# Patient Record
Sex: MALE | Race: WHITE | NOT HISPANIC OR LATINO | URBAN - METROPOLITAN AREA
[De-identification: names, ages, dates, MRNs, and addresses within clinical notes are randomized per-mention and may not be internally consistent; named-entity substitution may affect disease eponyms.]

---

## 2019-01-22 ENCOUNTER — AMBULATORY SURGICAL CENTER (OUTPATIENT)
Dept: URBAN - METROPOLITAN AREA SURGERY 10 | Facility: SURGERY | Age: 49
End: 2019-01-22
Payer: COMMERCIAL

## 2019-01-22 ENCOUNTER — OFFICE (OUTPATIENT)
Dept: URBAN - METROPOLITAN AREA PATHOLOGY 4 | Facility: PATHOLOGY | Age: 49
End: 2019-01-22
Payer: COMMERCIAL

## 2019-01-22 DIAGNOSIS — K21.9 GASTRO-ESOPHAGEAL REFLUX DISEASE WITHOUT ESOPHAGITIS: ICD-10-CM

## 2019-01-22 DIAGNOSIS — K22.70 BARRETT'S ESOPHAGUS WITHOUT DYSPLASIA: ICD-10-CM

## 2019-01-22 DIAGNOSIS — Z12.11 ENCOUNTER FOR SCREENING FOR MALIGNANT NEOPLASM OF COLON: ICD-10-CM

## 2019-01-22 DIAGNOSIS — K64.0 FIRST DEGREE HEMORRHOIDS: ICD-10-CM

## 2019-01-22 DIAGNOSIS — D12.3 BENIGN NEOPLASM OF TRANSVERSE COLON: ICD-10-CM

## 2019-01-22 PROCEDURE — 45385 COLONOSCOPY W/LESION REMOVAL: CPT | Mod: 33 | Performed by: INTERNAL MEDICINE

## 2019-01-22 PROCEDURE — 88305 TISSUE EXAM BY PATHOLOGIST: CPT | Mod: 26 | Performed by: INTERNAL MEDICINE

## 2019-01-22 PROCEDURE — 43239 EGD BIOPSY SINGLE/MULTIPLE: CPT | Mod: 48 | Performed by: INTERNAL MEDICINE

## 2024-02-08 ENCOUNTER — OFFICE (OUTPATIENT)
Dept: URBAN - METROPOLITAN AREA PATHOLOGY 4 | Facility: PATHOLOGY | Age: 54
End: 2024-02-08

## 2024-02-08 ENCOUNTER — AMBULATORY SURGICAL CENTER (OUTPATIENT)
Dept: URBAN - METROPOLITAN AREA SURGERY 10 | Facility: SURGERY | Age: 54
End: 2024-02-08
Payer: COMMERCIAL

## 2024-02-08 DIAGNOSIS — Z09 ENCOUNTER FOR FOLLOW-UP EXAMINATION AFTER COMPLETED TREATMEN: ICD-10-CM

## 2024-02-08 DIAGNOSIS — K22.89 OTHER SPECIFIED DISEASE OF ESOPHAGUS: ICD-10-CM

## 2024-02-08 DIAGNOSIS — K64.8 OTHER HEMORRHOIDS: ICD-10-CM

## 2024-02-08 DIAGNOSIS — K31.89 OTHER DISEASES OF STOMACH AND DUODENUM: ICD-10-CM

## 2024-02-08 DIAGNOSIS — K57.90 DIVERTICULOSIS OF INTESTINE, PART UNSPECIFIED, WITHOUT PERFO: ICD-10-CM

## 2024-02-08 DIAGNOSIS — Z86.010 PERSONAL HISTORY OF COLONIC POLYPS: ICD-10-CM

## 2024-02-08 DIAGNOSIS — K21.9 GASTRO-ESOPHAGEAL REFLUX DISEASE WITHOUT ESOPHAGITIS: ICD-10-CM

## 2024-02-08 PROCEDURE — G0105 COLORECTAL SCRN; HI RISK IND: HCPCS | Performed by: INTERNAL MEDICINE

## 2024-02-08 PROCEDURE — 43239 EGD BIOPSY SINGLE/MULTIPLE: CPT | Performed by: INTERNAL MEDICINE

## 2024-02-08 PROCEDURE — 88305 TISSUE EXAM BY PATHOLOGIST: CPT | Performed by: PATHOLOGY

## 2024-02-12 PROBLEM — Z86.010 PERSONAL HISTORY OF COLON POLYPS: Status: ACTIVE | Noted: 2024-02-12

## 2024-02-12 PROBLEM — K22.70 BARRETT'S ESOPHAGUS: Status: ACTIVE | Noted: 2024-02-12

## 2024-10-15 ENCOUNTER — DOCUMENTATION (OUTPATIENT)
Dept: ENDOCRINOLOGY | Facility: CLINIC | Age: 54
End: 2024-10-15

## 2024-10-15 ENCOUNTER — OFFICE VISIT (OUTPATIENT)
Dept: ENDOCRINOLOGY | Facility: CLINIC | Age: 54
End: 2024-10-15
Payer: COMMERCIAL

## 2024-10-15 VITALS
DIASTOLIC BLOOD PRESSURE: 78 MMHG | OXYGEN SATURATION: 99 % | BODY MASS INDEX: 28.06 KG/M2 | WEIGHT: 196 LBS | HEART RATE: 80 BPM | SYSTOLIC BLOOD PRESSURE: 126 MMHG | HEIGHT: 70 IN

## 2024-10-15 DIAGNOSIS — E11.65 TYPE 2 DIABETES MELLITUS WITH HYPERGLYCEMIA, WITHOUT LONG-TERM CURRENT USE OF INSULIN: Primary | ICD-10-CM

## 2024-10-15 LAB
ALBUMIN SERPL-MCNC: 4.5 G/DL (ref 3.5–5.2)
ALBUMIN UR-MCNC: <1.2 MG/DL
ALBUMIN/GLOB SERPL: 1.6 G/DL
ALP SERPL-CCNC: 40 U/L (ref 39–117)
ALT SERPL W P-5'-P-CCNC: 15 U/L (ref 1–41)
ANION GAP SERPL CALCULATED.3IONS-SCNC: 12.7 MMOL/L (ref 5–15)
AST SERPL-CCNC: 16 U/L (ref 1–40)
BILIRUB SERPL-MCNC: 0.3 MG/DL (ref 0–1.2)
BUN SERPL-MCNC: 22 MG/DL (ref 6–20)
BUN/CREAT SERPL: 21.6 (ref 7–25)
CALCIUM SPEC-SCNC: 9.6 MG/DL (ref 8.6–10.5)
CHLORIDE SERPL-SCNC: 101 MMOL/L (ref 98–107)
CO2 SERPL-SCNC: 26.3 MMOL/L (ref 22–29)
CREAT SERPL-MCNC: 1.02 MG/DL (ref 0.76–1.27)
CREAT UR-MCNC: 69.9 MG/DL
EGFRCR SERPLBLD CKD-EPI 2021: 86.3 ML/MIN/1.73
EXPIRATION DATE: ABNORMAL
EXPIRATION DATE: ABNORMAL
GLOBULIN UR ELPH-MCNC: 2.8 GM/DL
GLUCOSE BLDC GLUCOMTR-MCNC: 163 MG/DL (ref 70–130)
GLUCOSE SERPL-MCNC: 151 MG/DL (ref 65–99)
HBA1C MFR BLD: 7.1 % (ref 4.5–5.7)
Lab: ABNORMAL
Lab: ABNORMAL
MICROALBUMIN/CREAT UR: NORMAL MG/G{CREAT}
POTASSIUM SERPL-SCNC: 4.7 MMOL/L (ref 3.5–5.2)
PROT SERPL-MCNC: 7.3 G/DL (ref 6–8.5)
SODIUM SERPL-SCNC: 140 MMOL/L (ref 136–145)
TSH SERPL DL<=0.05 MIU/L-ACNC: 1.49 UIU/ML (ref 0.27–4.2)

## 2024-10-15 PROCEDURE — 80053 COMPREHEN METABOLIC PANEL: CPT | Performed by: INTERNAL MEDICINE

## 2024-10-15 PROCEDURE — 82947 ASSAY GLUCOSE BLOOD QUANT: CPT | Performed by: INTERNAL MEDICINE

## 2024-10-15 PROCEDURE — 82570 ASSAY OF URINE CREATININE: CPT | Performed by: INTERNAL MEDICINE

## 2024-10-15 PROCEDURE — 84443 ASSAY THYROID STIM HORMONE: CPT | Performed by: INTERNAL MEDICINE

## 2024-10-15 PROCEDURE — 99204 OFFICE O/P NEW MOD 45 MIN: CPT | Performed by: INTERNAL MEDICINE

## 2024-10-15 PROCEDURE — 83036 HEMOGLOBIN GLYCOSYLATED A1C: CPT | Performed by: INTERNAL MEDICINE

## 2024-10-15 PROCEDURE — 84305 ASSAY OF SOMATOMEDIN: CPT | Performed by: INTERNAL MEDICINE

## 2024-10-15 PROCEDURE — 86341 ISLET CELL ANTIBODY: CPT | Performed by: INTERNAL MEDICINE

## 2024-10-15 PROCEDURE — 82043 UR ALBUMIN QUANTITATIVE: CPT | Performed by: INTERNAL MEDICINE

## 2024-10-15 RX ORDER — NAPROXEN 500 MG/1
500 TABLET, DELAYED RELEASE ORAL 2 TIMES DAILY
COMMUNITY

## 2024-10-15 RX ORDER — BLOOD-GLUCOSE SENSOR
1 EACH MISCELLANEOUS
Qty: 6 EACH | Refills: 3 | Status: SHIPPED | OUTPATIENT
Start: 2024-10-15

## 2024-10-15 RX ORDER — ALPRAZOLAM 0.25 MG
0.25 TABLET ORAL
COMMUNITY

## 2024-10-15 RX ORDER — DULAGLUTIDE 3 MG/.5ML
INJECTION, SOLUTION SUBCUTANEOUS
COMMUNITY

## 2024-10-15 RX ORDER — OMEPRAZOLE 40 MG/1
CAPSULE, DELAYED RELEASE ORAL DAILY
COMMUNITY
Start: 2024-07-17

## 2024-10-15 RX ORDER — METFORMIN HCL 500 MG
TABLET, EXTENDED RELEASE 24 HR ORAL
COMMUNITY
Start: 2024-07-17

## 2024-10-15 NOTE — PROGRESS NOTES
Pt seen for CGM start and nutrition review. Pt started  on Lorraine 3, he plans to pay out of pocket, he is paired for sharing in [employer].  Discussed nutrition for glucose control and physical activity. Encouraged pt to reach out with questions/concerns.

## 2024-10-15 NOTE — PROGRESS NOTES
"     Office Note      Date: 10/15/2024  Patient Name: Brian James  MRN: [id number]  : 1970    Chief Complaint   Patient presents with    Diabetes       History of Present Illness:   Brian James is a 54 y.o. male who presents for Diabetes type 2. Diagnosed in: . Treated in past with oral agents. He was on glimepiride in the past.  Current treatments: metformin and trulicity. Number of insulin shots per day: none. Checks blood sugar none times a day. Has low blood sugar: no. Aspirin use: No -   . Statin use: No -   . ACE-I/ARB use: No -   .  Last eye exam:  2024.    Last A1c was 7.7%.  He has been very active with exercise.  He has changed his diet.  He has lost 20 pounds.  However, he notes the A1c has continued to increase.    Subjective      Diabetic Complications:  Eyes: No  Kidneys: No  Feet: No  Heart: No    Diet and Exercise:  Meals per day: 3  Minutes of exercise per week: 300 mins.    Review of Systems:   Review of Systems   Constitutional: Negative.    HENT: Negative.     Eyes: Negative.    Respiratory: Negative.     Cardiovascular: Negative.    Gastrointestinal: Negative.    Endocrine: Negative.    Genitourinary: Negative.    Musculoskeletal: Negative.    Skin: Negative.    Allergic/Immunologic: Negative.    Neurological: Negative.    Hematological: Negative.    Psychiatric/Behavioral: Negative.         The following portions of the patient's history were reviewed and updated as appropriate: allergies, current medications, past family history, past medical history, past social history, past surgical history, and problem list.    Objective     Visit Vitals  /78   Pulse 80   Ht 177.8 cm (70\")   Wt 88.9 kg (196 lb)   SpO2 99%   BMI 28.12 kg/m²       Physical Exam:  Physical Exam  Constitutional:       Appearance: Normal appearance.   HENT:      Head: Normocephalic and atraumatic.   Eyes:      Extraocular Movements: Extraocular movements intact.      Conjunctiva/sclera: Conjunctivae " "normal.   Neck:      Thyroid: No thyroid mass, thyromegaly or thyroid tenderness.   Cardiovascular:      Rate and Rhythm: Normal rate and regular rhythm.      Pulses: Normal pulses.           Dorsalis pedis pulses are 2+ on the right side and 2+ on the left side.        Posterior tibial pulses are 2+ on the right side and 2+ on the left side.      Heart sounds: Normal heart sounds.   Pulmonary:      Effort: Pulmonary effort is normal.      Breath sounds: Normal breath sounds.   Abdominal:      General: Bowel sounds are normal.      Palpations: Abdomen is soft.   Musculoskeletal:         General: Normal range of motion.      Cervical back: Normal range of motion and neck supple.      Right foot: Deformity present.      Left foot: Deformity present.   Feet:      Right foot:      Protective Sensation: 5 sites tested.  5 sites sensed.      Skin integrity: Skin integrity normal.      Toenail Condition: Right toenails are normal.      Left foot:      Protective Sensation: 5 sites tested.  5 sites sensed.      Skin integrity: Skin integrity normal.      Toenail Condition: Left toenails are normal.      Comments: Hammer toes  Lymphadenopathy:      Cervical: No cervical adenopathy.   Skin:     General: Skin is warm and dry.   Neurological:      General: No focal deficit present.      Mental Status: He is alert.   Psychiatric:         Mood and Affect: Mood normal.         Behavior: Behavior normal.         Thought Content: Thought content normal.         Judgment: Judgment normal.         Labs:    HbA1c  Hemoglobin A1C   Date Value Ref Range Status   10/15/2024 7.1 (A) 4.5 - 5.7 % Final   .    CMP  No results found for: \"GLUCOSE\", \"BUN\", \"CREATININE\", \"EGFRIFNONA\", \"EGFRIFAFRI\", \"BCR\", \"K\", \"CO2\", \"CALCIUM\", \"PROTENTOTREF\", \"LABIL2\", \"BILIRUBIN\", \"AST\", \"ALT\"     Lipid Panel        TSH  No results found for: \"TSH\", \"FREET4\"     Hemoglobin A1C  Lab Results   Component Value Date    HGBA1C 7.1 (A) 10/15/2024    " "    Microalbumin/Creatinine  No results found for: \"MALBCRERATI\"        Assessment / Plan      Assessment & Plan:  Diagnoses and all orders for this visit:    1. Type 2 diabetes mellitus with hyperglycemia, without long-term current use of insulin (Primary)  Assessment & Plan:  Diabetes is improving with treatment.   Continue current treatment regimen.  We discussed options of increasing trulicity but he wanted to continue to work on diet/exercise.  Diabetes will be reassessed in 6 months.    We discussed CGM.  He would like to use this even though his insurance won't cover it.      I would like to check some further labs today to look for type I diabetes also.    Will have him meet with our diabetes educator today as well.    Orders:  -     Comprehensive Metabolic Panel; Future  -     TSH; Future  -     Microalbumin / Creatinine Urine Ratio - Urine, Clean Catch; Future  -     IA-2 Autoantibodies; Future  -     Glutamic Acid Decarboxylase; Future  -     POC Glycosylated Hemoglobin (Hb A1C)  -     POC Glucose, Blood  -     Insulin-like Growth Factor; Future    Other orders  -     Continuous Glucose Sensor (FreeStyle Lorraine 3 Sensor) misc; Use 1 each Every 14 (Fourteen) Days.  Dispense: 6 each; Refill: 3       Current Outpatient Medications   Medication Instructions    ALPRAZolam (XANAX) 0.25 mg    Continuous Glucose Sensor (FreeStyle Lorraine 3 Sensor) misc 1 each, Does not apply, Every 14 Days    EC-Naproxen 500 mg, 2 Times Daily    metFORMIN ER (GLUCOPHAGE-XR) 500 MG 24 hr tablet Daily With Breakfast    omeprazole (priLOSEC) 40 MG capsule Daily    Trulicity 3 MG/0.5ML solution pen-injector INJECT 3MG SUBCUTANEOUSLY WEEKLY      Return in about 6 months (around 4/15/2025) for Recheck with A1c.    Electronically signed by: Paddy Gresham MD  10/15/2024  "

## 2024-10-15 NOTE — ASSESSMENT & PLAN NOTE
Diabetes is improving with treatment.   Continue current treatment regimen.  We discussed options of increasing trulicity but he wanted to continue to work on diet/exercise.  Diabetes will be reassessed in 6 months.    We discussed CGM.  He would like to use this even though his insurance won't cover it.      I would like to check some further labs today to look for type I diabetes also.    Will have him meet with our diabetes educator today as well.

## 2024-10-18 LAB
GAD65 AB SER IA-ACNC: <5 U/ML (ref 0–5)
IGF-I SERPL-MCNC: 141 NG/ML (ref 68–247)

## 2024-10-27 LAB — ISLET CELL512 AB SER-ACNC: <7.5 U/ML

## 2025-02-19 LAB
GFR SERPL CREATININE-BSD FRML MDRD: 103 ML/MIN/1.73M*2 (ref 60–?)
HBA1C MFR BLD: 7.6 %

## 2025-04-24 ENCOUNTER — OFFICE VISIT (OUTPATIENT)
Dept: ENDOCRINOLOGY | Facility: CLINIC | Age: 55
End: 2025-04-24
Payer: COMMERCIAL

## 2025-04-24 VITALS
BODY MASS INDEX: 28.6 KG/M2 | OXYGEN SATURATION: 99 % | HEIGHT: 70 IN | SYSTOLIC BLOOD PRESSURE: 102 MMHG | WEIGHT: 199.8 LBS | DIASTOLIC BLOOD PRESSURE: 78 MMHG | HEART RATE: 82 BPM

## 2025-04-24 DIAGNOSIS — E11.65 TYPE 2 DIABETES MELLITUS WITH HYPERGLYCEMIA, WITHOUT LONG-TERM CURRENT USE OF INSULIN: Primary | ICD-10-CM

## 2025-04-24 LAB
EXPIRATION DATE: ABNORMAL
GLUCOSE BLDC GLUCOMTR-MCNC: 144 MG/DL (ref 70–130)
Lab: ABNORMAL

## 2025-04-24 NOTE — PROGRESS NOTES
"     Office Note      Date: 2025  Patient Name: Brian James  MRN: [id number]  : 1970    Chief Complaint   Patient presents with    Diabetes     Type 2 diabetes mellitus with hyperglycemia, without long-term current use of insulin       History of Present Illness:   Brian James is a 54 y.o. male who presents for Diabetes type 2. Diagnosed in: . Treated in past with oral agents. He was on glimepiride in the past.  Current treatments: metformin and trulicity. Number of insulin shots per day: none. Checks blood sugar 288 times a day - on FreeStyle Lorraine 3. Has low blood sugar: no. Aspirin use: No. Statin use: No. ACE-I/ARB use: No.  Change in health since last visit: none.  Last eye exam:  2024.     Subjective      Diabetic Complications:  Eyes: No  Kidneys: No  Feet: No  Heart: No    Diet and Exercise:  Meals per day: 3  Minutes of exercise per week: 300 mins.    Review of Systems:   Review of Systems   Constitutional: Negative.    Cardiovascular: Negative.    Gastrointestinal: Negative.    Endocrine: Negative.        The following portions of the patient's history were reviewed and updated as appropriate: allergies, current medications, past family history, past medical history, past social history, past surgical history, and problem list.    Objective     Visit Vitals  /78 (BP Location: Right arm, Patient Position: Sitting, Cuff Size: Adult)   Pulse 82   Ht 177.8 cm (70\")   Wt 90.6 kg (199 lb 12.8 oz)   SpO2 99%   BMI 28.67 kg/m²       Physical Exam:  Physical Exam  Constitutional:       Appearance: Normal appearance.   Neurological:      Mental Status: He is alert.         Labs:    HbA1c  Lab Results   Component Value Date    HGBA1C 7.6 2025       CMP  Lab Results   Component Value Date    GLUCOSE 151 (H) 10/15/2024    BUN 22 (H) 10/15/2024    CREATININE 1.02 10/15/2024    BCR 21.6 10/15/2024    K 4.7 10/15/2024    CO2 26.3 10/15/2024    CALCIUM 9.6 10/15/2024    AST 16 10/15/2024 " "   ALT 15 10/15/2024        Lipid Panel  No results found for: \"HDL\", \"LDL\", \"TRIG\"     TSH  Lab Results   Component Value Date    TSH 1.490 10/15/2024        Hemoglobin A1C  No components found for: \"HGBA1C\"     Microalbumin/Creatinine  Lab Results   Component Value Date    GERTRUDEO  10/15/2024      Comment:      Unable to calculate    MICROALBUR <1.2 10/15/2024           Assessment / Plan      Assessment & Plan:  Diagnoses and all orders for this visit:    1. Type 2 diabetes mellitus with hyperglycemia, without long-term current use of insulin (Primary)  Assessment & Plan:  Diabetes is worsening.  A1c has crept up despite diet and exercise.  We discussed increase in trulicity as an option.  He didn't want to do this.  He says he would like to come off the medication at some time.  Continue current treatment regimen.  Diabetes will be reassessed in 6 months.    FreeStyle Lorraine 3 CGM was downloaded today.  This revealed some day to day variability.  No pattern for medication adjustments since he doesn't want to increase trulicity.    He notes spikes after morning exercise.  He isn't eating before he exercises.  We discussed eating some carbs and protein before he exercises.    Orders:  -     POC Glucose, Blood  -     Cancel: POC Glycosylated Hemoglobin (Hb A1C)      Current Outpatient Medications   Medication Instructions    ALPRAZolam (XANAX) 0.25 mg    Continuous Glucose Sensor (FreeStyle Lorraine 3 Sensor) misc 1 each, Not Applicable, Every 14 Days    EC-Naproxen 500 mg, 2 Times Daily    metFORMIN ER (GLUCOPHAGE-XR) 500 MG 24 hr tablet Daily With Breakfast    omeprazole (priLOSEC) 40 MG capsule Daily    Trulicity 3 MG/0.5ML solution pen-injector INJECT 3MG SUBCUTANEOUSLY WEEKLY      Return in about 6 months (around 10/24/2025) for Recheck with A1c, CMP, lipid, TSH, microalbumin, foot exam.    Electronically signed by: Paddy Gresham MD  04/24/2025  "

## 2025-04-24 NOTE — ASSESSMENT & PLAN NOTE
Diabetes is worsening.  A1c has crept up despite diet and exercise.  We discussed increase in trulicity as an option.  He didn't want to do this.  He says he would like to come off the medication at some time.  Continue current treatment regimen.  Diabetes will be reassessed in 6 months.    FreeStyle Lorraine 3 CGM was downloaded today.  This revealed some day to day variability.  No pattern for medication adjustments since he doesn't want to increase trulicity.    He notes spikes after morning exercise.  He isn't eating before he exercises.  We discussed eating some carbs and protein before he exercises.